# Patient Record
Sex: FEMALE | Race: WHITE | NOT HISPANIC OR LATINO | Employment: FULL TIME | URBAN - METROPOLITAN AREA
[De-identification: names, ages, dates, MRNs, and addresses within clinical notes are randomized per-mention and may not be internally consistent; named-entity substitution may affect disease eponyms.]

---

## 2022-05-01 ENCOUNTER — APPOINTMENT (OUTPATIENT)
Dept: CT IMAGING | Facility: CLINIC | Age: 47
End: 2022-05-01
Attending: EMERGENCY MEDICINE
Payer: COMMERCIAL

## 2022-05-01 ENCOUNTER — HOSPITAL ENCOUNTER (EMERGENCY)
Facility: CLINIC | Age: 47
Discharge: HOME | End: 2022-05-01
Attending: EMERGENCY MEDICINE | Admitting: EMERGENCY MEDICINE
Payer: COMMERCIAL

## 2022-05-01 VITALS
DIASTOLIC BLOOD PRESSURE: 85 MMHG | HEART RATE: 75 BPM | RESPIRATION RATE: 20 BRPM | SYSTOLIC BLOOD PRESSURE: 133 MMHG | TEMPERATURE: 97.9 F | OXYGEN SATURATION: 99 %

## 2022-05-01 DIAGNOSIS — R10.9 LEFT FLANK PAIN: ICD-10-CM

## 2022-05-01 LAB
ALBUMIN UR-MCNC: NEGATIVE MG/DL
ANION GAP SERPL CALCULATED.3IONS-SCNC: 5 MMOL/L (ref 3–14)
APPEARANCE UR: CLEAR
BACTERIA #/AREA URNS HPF: ABNORMAL /HPF
BASOPHILS # BLD AUTO: 0.1 10E3/UL (ref 0–0.2)
BASOPHILS NFR BLD AUTO: 1 %
BILIRUB UR QL STRIP: NEGATIVE
BUN SERPL-MCNC: 15 MG/DL (ref 7–30)
CALCIUM SERPL-MCNC: 10.1 MG/DL (ref 8.5–10.1)
CHLORIDE BLD-SCNC: 103 MMOL/L (ref 94–109)
CO2 SERPL-SCNC: 27 MMOL/L (ref 20–32)
COLOR UR AUTO: ABNORMAL
CREAT SERPL-MCNC: 0.79 MG/DL (ref 0.52–1.04)
EOSINOPHIL # BLD AUTO: 0.1 10E3/UL (ref 0–0.7)
EOSINOPHIL NFR BLD AUTO: 1 %
ERYTHROCYTE [DISTWIDTH] IN BLOOD BY AUTOMATED COUNT: 13.1 % (ref 10–15)
GFR SERPL CREATININE-BSD FRML MDRD: >90 ML/MIN/1.73M2
GLUCOSE BLD-MCNC: 163 MG/DL (ref 70–99)
GLUCOSE UR STRIP-MCNC: NEGATIVE MG/DL
HCT VFR BLD AUTO: 43.1 % (ref 35–47)
HGB BLD-MCNC: 14.4 G/DL (ref 11.7–15.7)
HGB UR QL STRIP: NEGATIVE
HOLD SPECIMEN: NORMAL
HYALINE CASTS: 3 /LPF
IMM GRANULOCYTES # BLD: 0 10E3/UL
IMM GRANULOCYTES NFR BLD: 0 %
KETONES UR STRIP-MCNC: NEGATIVE MG/DL
LEUKOCYTE ESTERASE UR QL STRIP: NEGATIVE
LYMPHOCYTES # BLD AUTO: 2.2 10E3/UL (ref 0.8–5.3)
LYMPHOCYTES NFR BLD AUTO: 23 %
MCH RBC QN AUTO: 28.9 PG (ref 26.5–33)
MCHC RBC AUTO-ENTMCNC: 33.4 G/DL (ref 31.5–36.5)
MCV RBC AUTO: 86 FL (ref 78–100)
MONOCYTES # BLD AUTO: 0.3 10E3/UL (ref 0–1.3)
MONOCYTES NFR BLD AUTO: 3 %
MUCOUS THREADS #/AREA URNS LPF: PRESENT /LPF
NEUTROPHILS # BLD AUTO: 6.8 10E3/UL (ref 1.6–8.3)
NEUTROPHILS NFR BLD AUTO: 72 %
NITRATE UR QL: NEGATIVE
NRBC # BLD AUTO: 0 10E3/UL
NRBC BLD AUTO-RTO: 0 /100
PH UR STRIP: 7.5 [PH] (ref 5–7)
PLATELET # BLD AUTO: 339 10E3/UL (ref 150–450)
POTASSIUM BLD-SCNC: 3.8 MMOL/L (ref 3.4–5.3)
RBC # BLD AUTO: 4.99 10E6/UL (ref 3.8–5.2)
RBC URINE: 1 /HPF
SODIUM SERPL-SCNC: 135 MMOL/L (ref 133–144)
SP GR UR STRIP: 1.02 (ref 1–1.03)
SQUAMOUS EPITHELIAL: 1 /HPF
UROBILINOGEN UR STRIP-MCNC: NORMAL MG/DL
WBC # BLD AUTO: 9.5 10E3/UL (ref 4–11)
WBC URINE: 1 /HPF

## 2022-05-01 PROCEDURE — 36415 COLL VENOUS BLD VENIPUNCTURE: CPT | Performed by: EMERGENCY MEDICINE

## 2022-05-01 PROCEDURE — 85025 COMPLETE CBC W/AUTO DIFF WBC: CPT | Performed by: EMERGENCY MEDICINE

## 2022-05-01 PROCEDURE — 99285 EMERGENCY DEPT VISIT HI MDM: CPT | Mod: 25

## 2022-05-01 PROCEDURE — 250N000011 HC RX IP 250 OP 636: Performed by: EMERGENCY MEDICINE

## 2022-05-01 PROCEDURE — 81003 URINALYSIS AUTO W/O SCOPE: CPT | Performed by: EMERGENCY MEDICINE

## 2022-05-01 PROCEDURE — 74177 CT ABD & PELVIS W/CONTRAST: CPT

## 2022-05-01 PROCEDURE — 258N000003 HC RX IP 258 OP 636: Performed by: EMERGENCY MEDICINE

## 2022-05-01 PROCEDURE — 96375 TX/PRO/DX INJ NEW DRUG ADDON: CPT

## 2022-05-01 PROCEDURE — 96374 THER/PROPH/DIAG INJ IV PUSH: CPT | Mod: 59

## 2022-05-01 PROCEDURE — 250N000009 HC RX 250: Performed by: EMERGENCY MEDICINE

## 2022-05-01 PROCEDURE — 80048 BASIC METABOLIC PNL TOTAL CA: CPT | Performed by: EMERGENCY MEDICINE

## 2022-05-01 PROCEDURE — 96361 HYDRATE IV INFUSION ADD-ON: CPT

## 2022-05-01 RX ORDER — OXYCODONE HYDROCHLORIDE 5 MG/1
5 TABLET ORAL EVERY 6 HOURS PRN
Qty: 10 TABLET | Refills: 0 | Status: SHIPPED | OUTPATIENT
Start: 2022-05-01

## 2022-05-01 RX ORDER — MORPHINE SULFATE 2 MG/ML
2-4 INJECTION, SOLUTION INTRAMUSCULAR; INTRAVENOUS EVERY 30 MIN PRN
Status: DISCONTINUED | OUTPATIENT
Start: 2022-05-01 | End: 2022-05-01 | Stop reason: HOSPADM

## 2022-05-01 RX ORDER — KETOROLAC TROMETHAMINE 15 MG/ML
15 INJECTION, SOLUTION INTRAMUSCULAR; INTRAVENOUS ONCE
Status: COMPLETED | OUTPATIENT
Start: 2022-05-01 | End: 2022-05-01

## 2022-05-01 RX ORDER — ONDANSETRON 4 MG/1
4 TABLET, ORALLY DISINTEGRATING ORAL EVERY 8 HOURS PRN
Qty: 10 TABLET | Refills: 0 | Status: SHIPPED | OUTPATIENT
Start: 2022-05-01 | End: 2022-05-04

## 2022-05-01 RX ORDER — IOPAMIDOL 755 MG/ML
500 INJECTION, SOLUTION INTRAVASCULAR ONCE
Status: COMPLETED | OUTPATIENT
Start: 2022-05-01 | End: 2022-05-01

## 2022-05-01 RX ORDER — MORPHINE SULFATE 4 MG/ML
4 INJECTION, SOLUTION INTRAMUSCULAR; INTRAVENOUS ONCE
Status: COMPLETED | OUTPATIENT
Start: 2022-05-01 | End: 2022-05-01

## 2022-05-01 RX ADMIN — SODIUM CHLORIDE 65 ML: 9 INJECTION, SOLUTION INTRAVENOUS at 09:44

## 2022-05-01 RX ADMIN — IOPAMIDOL 100 ML: 755 INJECTION, SOLUTION INTRAVENOUS at 09:44

## 2022-05-01 RX ADMIN — KETOROLAC TROMETHAMINE 15 MG: 15 INJECTION, SOLUTION INTRAMUSCULAR; INTRAVENOUS at 08:35

## 2022-05-01 RX ADMIN — SODIUM CHLORIDE, POTASSIUM CHLORIDE, SODIUM LACTATE AND CALCIUM CHLORIDE 1000 ML: 600; 310; 30; 20 INJECTION, SOLUTION INTRAVENOUS at 08:35

## 2022-05-01 RX ADMIN — MORPHINE SULFATE 4 MG: 4 INJECTION INTRAVENOUS at 10:05

## 2022-05-01 ASSESSMENT — ENCOUNTER SYMPTOMS
DIFFICULTY URINATING: 0
HEMATURIA: 0
CHILLS: 1
FREQUENCY: 0
DIARRHEA: 0
DYSURIA: 0
DIAPHORESIS: 1
ABDOMINAL PAIN: 1
CONSTIPATION: 0

## 2022-05-01 NOTE — ED PROVIDER NOTES
History   Chief Complaint:  Flank Pain       HPI   Valorie Gomes is a 46 year old female with history of type II diabetes, hypertension, hyperlipidemia who presents with left-sided flank pain since yesterday at 1100 accompanied by bloating, chills and a generally feeling of sickness. Her temperature was reported to be 98 yesterday. Endorses left flank pain, chills, diaphoresis and decreased bloating in the ED.She notes trouble sleeping last night. No changes in bowel habits. Yesterday she had a bowel movement and has not had one yet today. Denies urinary symptoms.    Review of Systems   Constitutional: Positive for chills and diaphoresis.   Gastrointestinal: Positive for abdominal pain. Negative for constipation and diarrhea.   Genitourinary: Negative for difficulty urinating, dysuria, frequency, hematuria and urgency.   All other systems reviewed and are negative.      Allergies:  Penicillins  Benadryl Allergy  Demerol    Medications:  Zyrtec  Prilosec  Hydrochlorothiazide  Cozaar  Lipitor  Glucophage  Ultram    Past Medical History:   Morbid obesity  Hyperlipidemia  Type II diabetes mellitus  Hypertension  Glottic stenosis  Major depressive disorder   Endometriosis  Hydrosalpinx  Ankle pain      Past Surgical History:    Hand/finger procedure  Pelvic laparoscopy  Left knee procedure  [place] teeth extraction  Pelviscopy  Endometrial biopsy  Hysterotomy, cystoscopy, bilateral salpingectomy, right oophorectomy  Laryngoscopy, bronchoscopy, CO2 laser, balloon dilations     Family History:    Father - alcoholism, atrial fibrillation, heart disease  Mother - breast cancer  Sister - PCOS, celiac disease    Social History:  The patient presents to the ED alone.    Physical Exam     Patient Vitals for the past 24 hrs:   BP Temp Pulse Resp SpO2   05/01/22 1140 133/85 -- 75 -- 99 %   05/01/22 1050 121/74 -- -- 20 92 %   05/01/22 1034 -- -- -- -- 99 %   05/01/22 1020 -- -- -- -- 98 %   05/01/22 0920 131/86 -- 86 -- --    05/01/22 0805 (!) 124/92 -- 94 -- --   05/01/22 0713 (!) 150/105 97.9  F (36.6  C) 108 18 99 %       Physical Exam    HENT:  mmm  Eyes: periorbital tissue and sclera normal  Neck: supple  CV: ppi, regular   Resp: speaking in full sentences without any resp distress  Abd: Tenderness to the left lower quadrant and left mid abdomen, no rigidity or significant distention  Ext: peripheral edema present:  No  Skin: warm dry well perfused  Neuro: Alert, no gross motor or sensory deficits,  gait stable        Emergency Department Course     Imaging:  Abd/pelvis CT,  IV  contrast only TRAUMA / AAA   Final Result   IMPRESSION:    1.  No acute abnormality demonstrated in the abdomen or pelvis.   2.  Mild fatty infiltration of the liver.           Report per radiology    Laboratory:  Labs Ordered and Resulted from Time of ED Arrival to Time of ED Departure   ROUTINE UA WITH MICROSCOPIC REFLEX TO CULTURE - Abnormal       Result Value    Color Urine Light Yellow      Appearance Urine Clear      Glucose Urine Negative      Bilirubin Urine Negative      Ketones Urine Negative      Specific Gravity Urine 1.018      Blood Urine Negative      pH Urine 7.5 (*)     Protein Albumin Urine Negative      Urobilinogen Urine Normal      Nitrite Urine Negative      Leukocyte Esterase Urine Negative      Bacteria Urine Few (*)     Mucus Urine Present (*)     RBC Urine 1      WBC Urine 1      Squamous Epithelials Urine 1      Hyaline Casts Urine 3 (*)    BASIC METABOLIC PANEL - Abnormal    Sodium 135      Potassium 3.8      Chloride 103      Carbon Dioxide (CO2) 27      Anion Gap 5      Urea Nitrogen 15      Creatinine 0.79      Calcium 10.1      Glucose 163 (*)     GFR Estimate >90     CBC WITH PLATELETS AND DIFFERENTIAL    WBC Count 9.5      RBC Count 4.99      Hemoglobin 14.4      Hematocrit 43.1      MCV 86      MCH 28.9      MCHC 33.4      RDW 13.1      Platelet Count 339      % Neutrophils 72      % Lymphocytes 23      % Monocytes 3       % Eosinophils 1      % Basophils 1      % Immature Granulocytes 0      NRBCs per 100 WBC 0      Absolute Neutrophils 6.8      Absolute Lymphocytes 2.2      Absolute Monocytes 0.3      Absolute Eosinophils 0.1      Absolute Basophils 0.1      Absolute Immature Granulocytes 0.0      Absolute NRBCs 0.0          Procedures  None    Emergency Department Course:       Reviewed:  I reviewed nursing notes, vitals, past medical history and Care Everywhere    Assessments:  0829 I obtained history and examined the patient as noted above.     1202 I rechecked the patient and explained findings.     Interventions:  0835 Toradol 15 mg IV  0835 Lactated ringers bolus 1,000 mL IV    Disposition:  The patient was discharged to home.     Impression & Plan     CMS Diagnoses: None      Medical Decision Makin-year-old female here with left-sided abdominal/flank pain.  Etiology unclear despite work-up here.  Work-up is reassuring that there is no emergent surgical vascular infectious process present.  At this point think she is safe and stable for discharge home to allow this process to clinically declare itself.  She will return with any new or worsening symptoms.    Critical Care Time: None    Diagnosis:    ICD-10-CM    1. Left flank pain  R10.9        Discharge Medications:  Discharge Medication List as of 2022 12:05 PM      START taking these medications    Details   ondansetron (ZOFRAN ODT) 4 MG ODT tab Take 1 tablet (4 mg) by mouth every 8 hours as needed for nausea or vomiting, Disp-10 tablet, R-0, E-Prescribe      oxyCODONE (ROXICODONE) 5 MG tablet Take 1 tablet (5 mg) by mouth every 6 hours as needed for severe pain, Disp-10 tablet, R-0, E-Prescribe             Scribe Disclosure:  I, Lizabeth Tong, am serving as a scribe at 8:16 AM on 2022 to document services personally performed by Helio Last MD based on my observations and the provider's statements to me.              Helio Last,  MD  05/01/22 1604

## 2022-05-01 NOTE — ED TRIAGE NOTES
Patient presents to the ED reporting left flank pain, bloating and nausea since 1100 yesterday. States pain has been unrelieved with ibuprofen. Reports tried to take a left over tramadol to relieve pain, but vomited it back up. Denies history of kidney stones.

## 2022-05-04 ENCOUNTER — HOSPITAL ENCOUNTER (EMERGENCY)
Facility: CLINIC | Age: 47
Discharge: HOME | End: 2022-05-05
Attending: EMERGENCY MEDICINE | Admitting: EMERGENCY MEDICINE
Payer: COMMERCIAL

## 2022-05-04 DIAGNOSIS — M62.830 LUMBAR PARASPINAL MUSCLE SPASM: ICD-10-CM

## 2022-05-04 PROBLEM — E78.2 HYPERLIPIDEMIA, MIXED: Status: ACTIVE | Noted: 2021-06-23

## 2022-05-04 PROBLEM — I10 HTN (HYPERTENSION): Status: ACTIVE | Noted: 2017-08-22

## 2022-05-04 PROBLEM — J38.6 SUBGLOTTIC STENOSIS: Status: ACTIVE | Noted: 2020-04-14

## 2022-05-04 PROBLEM — E11.65 UNCONTROLLED TYPE 2 DIABETES MELLITUS WITH HYPERGLYCEMIA (H): Status: ACTIVE | Noted: 2021-06-22

## 2022-05-04 LAB
ALBUMIN SERPL-MCNC: 3.9 G/DL (ref 3.4–5)
ALP SERPL-CCNC: 94 U/L (ref 40–150)
ALT SERPL W P-5'-P-CCNC: 29 U/L (ref 0–50)
ANION GAP SERPL CALCULATED.3IONS-SCNC: 4 MMOL/L (ref 3–14)
AST SERPL W P-5'-P-CCNC: 13 U/L (ref 0–45)
BASOPHILS # BLD AUTO: 0.1 10E3/UL (ref 0–0.2)
BASOPHILS NFR BLD AUTO: 1 %
BILIRUB DIRECT SERPL-MCNC: 0.2 MG/DL (ref 0–0.2)
BILIRUB SERPL-MCNC: 0.6 MG/DL (ref 0.2–1.3)
BUN SERPL-MCNC: 12 MG/DL (ref 7–30)
CALCIUM SERPL-MCNC: 9.5 MG/DL (ref 8.5–10.1)
CHLORIDE BLD-SCNC: 101 MMOL/L (ref 94–109)
CO2 SERPL-SCNC: 29 MMOL/L (ref 20–32)
CREAT SERPL-MCNC: 0.76 MG/DL (ref 0.52–1.04)
EOSINOPHIL # BLD AUTO: 0.1 10E3/UL (ref 0–0.7)
EOSINOPHIL NFR BLD AUTO: 1 %
ERYTHROCYTE [DISTWIDTH] IN BLOOD BY AUTOMATED COUNT: 13 % (ref 10–15)
GFR SERPL CREATININE-BSD FRML MDRD: >90 ML/MIN/1.73M2
GLUCOSE BLD-MCNC: 182 MG/DL (ref 70–99)
HCT VFR BLD AUTO: 40.5 % (ref 35–47)
HGB BLD-MCNC: 13.6 G/DL (ref 11.7–15.7)
IMM GRANULOCYTES # BLD: 0 10E3/UL
IMM GRANULOCYTES NFR BLD: 0 %
LIPASE SERPL-CCNC: 88 U/L (ref 73–393)
LYMPHOCYTES # BLD AUTO: 2.3 10E3/UL (ref 0.8–5.3)
LYMPHOCYTES NFR BLD AUTO: 23 %
MCH RBC QN AUTO: 28.8 PG (ref 26.5–33)
MCHC RBC AUTO-ENTMCNC: 33.6 G/DL (ref 31.5–36.5)
MCV RBC AUTO: 86 FL (ref 78–100)
MONOCYTES # BLD AUTO: 0.5 10E3/UL (ref 0–1.3)
MONOCYTES NFR BLD AUTO: 5 %
NEUTROPHILS # BLD AUTO: 6.9 10E3/UL (ref 1.6–8.3)
NEUTROPHILS NFR BLD AUTO: 70 %
NRBC # BLD AUTO: 0 10E3/UL
NRBC BLD AUTO-RTO: 0 /100
PLATELET # BLD AUTO: 311 10E3/UL (ref 150–450)
POTASSIUM BLD-SCNC: 3.3 MMOL/L (ref 3.4–5.3)
PROT SERPL-MCNC: 7.5 G/DL (ref 6.8–8.8)
RBC # BLD AUTO: 4.72 10E6/UL (ref 3.8–5.2)
SODIUM SERPL-SCNC: 134 MMOL/L (ref 133–144)
WBC # BLD AUTO: 9.9 10E3/UL (ref 4–11)

## 2022-05-04 PROCEDURE — 96374 THER/PROPH/DIAG INJ IV PUSH: CPT

## 2022-05-04 PROCEDURE — 80053 COMPREHEN METABOLIC PANEL: CPT | Performed by: EMERGENCY MEDICINE

## 2022-05-04 PROCEDURE — 85025 COMPLETE CBC W/AUTO DIFF WBC: CPT | Performed by: EMERGENCY MEDICINE

## 2022-05-04 PROCEDURE — 96375 TX/PRO/DX INJ NEW DRUG ADDON: CPT

## 2022-05-04 PROCEDURE — 83690 ASSAY OF LIPASE: CPT | Performed by: EMERGENCY MEDICINE

## 2022-05-04 PROCEDURE — 82248 BILIRUBIN DIRECT: CPT | Performed by: EMERGENCY MEDICINE

## 2022-05-04 PROCEDURE — 99284 EMERGENCY DEPT VISIT MOD MDM: CPT | Mod: 25

## 2022-05-04 PROCEDURE — 250N000013 HC RX MED GY IP 250 OP 250 PS 637: Performed by: EMERGENCY MEDICINE

## 2022-05-04 PROCEDURE — 36415 COLL VENOUS BLD VENIPUNCTURE: CPT | Performed by: EMERGENCY MEDICINE

## 2022-05-04 RX ORDER — MORPHINE SULFATE 4 MG/ML
4 INJECTION, SOLUTION INTRAMUSCULAR; INTRAVENOUS
Status: DISCONTINUED | OUTPATIENT
Start: 2022-05-04 | End: 2022-05-04

## 2022-05-04 RX ORDER — KETOROLAC TROMETHAMINE 15 MG/ML
10 INJECTION, SOLUTION INTRAMUSCULAR; INTRAVENOUS ONCE
Status: DISCONTINUED | OUTPATIENT
Start: 2022-05-04 | End: 2022-05-04

## 2022-05-04 RX ORDER — LIDOCAINE 4 G/G
2 PATCH TOPICAL ONCE
Status: DISCONTINUED | OUTPATIENT
Start: 2022-05-04 | End: 2022-05-05 | Stop reason: HOSPADM

## 2022-05-04 RX ORDER — CYCLOBENZAPRINE HCL 10 MG
10 TABLET ORAL ONCE
Status: COMPLETED | OUTPATIENT
Start: 2022-05-04 | End: 2022-05-04

## 2022-05-04 RX ORDER — HYDROCODONE BITARTRATE AND ACETAMINOPHEN 5; 325 MG/1; MG/1
2 TABLET ORAL ONCE
Status: COMPLETED | OUTPATIENT
Start: 2022-05-04 | End: 2022-05-04

## 2022-05-04 RX ORDER — ONDANSETRON 2 MG/ML
4 INJECTION INTRAMUSCULAR; INTRAVENOUS ONCE
Status: COMPLETED | OUTPATIENT
Start: 2022-05-04 | End: 2022-05-05

## 2022-05-04 RX ADMIN — CYCLOBENZAPRINE HYDROCHLORIDE 10 MG: 10 TABLET, FILM COATED ORAL at 23:38

## 2022-05-04 RX ADMIN — LIDOCAINE 2 PATCH: 246 PATCH TOPICAL at 23:38

## 2022-05-04 RX ADMIN — HYDROCODONE BITARTRATE AND ACETAMINOPHEN 2 TABLET: 5; 325 TABLET ORAL at 23:38

## 2022-05-04 ASSESSMENT — ENCOUNTER SYMPTOMS
DYSURIA: 0
FREQUENCY: 0
VOMITING: 1
DIFFICULTY URINATING: 0
SHORTNESS OF BREATH: 0
FLANK PAIN: 1
FEVER: 0
DIARRHEA: 0
NAUSEA: 1

## 2022-05-05 VITALS
BODY MASS INDEX: 40.03 KG/M2 | DIASTOLIC BLOOD PRESSURE: 77 MMHG | RESPIRATION RATE: 18 BRPM | WEIGHT: 248 LBS | HEART RATE: 67 BPM | TEMPERATURE: 97.8 F | OXYGEN SATURATION: 97 % | SYSTOLIC BLOOD PRESSURE: 124 MMHG

## 2022-05-05 LAB
ALBUMIN UR-MCNC: NEGATIVE MG/DL
APPEARANCE UR: CLEAR
BILIRUB UR QL STRIP: NEGATIVE
COLOR UR AUTO: ABNORMAL
GLUCOSE UR STRIP-MCNC: NEGATIVE MG/DL
HGB UR QL STRIP: NEGATIVE
KETONES UR STRIP-MCNC: NEGATIVE MG/DL
LEUKOCYTE ESTERASE UR QL STRIP: NEGATIVE
MUCOUS THREADS #/AREA URNS LPF: PRESENT /LPF
NITRATE UR QL: NEGATIVE
PH UR STRIP: 5 [PH] (ref 5–7)
RBC URINE: <1 /HPF
SP GR UR STRIP: 1.01 (ref 1–1.03)
SQUAMOUS EPITHELIAL: 1 /HPF
UROBILINOGEN UR STRIP-MCNC: NORMAL MG/DL
WBC URINE: 3 /HPF

## 2022-05-05 PROCEDURE — 250N000011 HC RX IP 250 OP 636: Performed by: EMERGENCY MEDICINE

## 2022-05-05 PROCEDURE — 81001 URINALYSIS AUTO W/SCOPE: CPT | Performed by: EMERGENCY MEDICINE

## 2022-05-05 PROCEDURE — 250N000013 HC RX MED GY IP 250 OP 250 PS 637: Performed by: EMERGENCY MEDICINE

## 2022-05-05 RX ORDER — CYCLOBENZAPRINE HCL 10 MG
5-10 TABLET ORAL 3 TIMES DAILY PRN
Qty: 15 TABLET | Refills: 0 | Status: SHIPPED | OUTPATIENT
Start: 2022-05-05

## 2022-05-05 RX ORDER — POTASSIUM CHLORIDE 1500 MG/1
20 TABLET, EXTENDED RELEASE ORAL ONCE
Status: COMPLETED | OUTPATIENT
Start: 2022-05-05 | End: 2022-05-05

## 2022-05-05 RX ORDER — HYDROCODONE BITARTRATE AND ACETAMINOPHEN 5; 325 MG/1; MG/1
1 TABLET ORAL EVERY 6 HOURS PRN
Qty: 10 TABLET | Refills: 0 | Status: SHIPPED | OUTPATIENT
Start: 2022-05-05 | End: 2022-05-08

## 2022-05-05 RX ORDER — KETOROLAC TROMETHAMINE 15 MG/ML
15 INJECTION, SOLUTION INTRAMUSCULAR; INTRAVENOUS ONCE
Status: COMPLETED | OUTPATIENT
Start: 2022-05-05 | End: 2022-05-05

## 2022-05-05 RX ADMIN — POTASSIUM CHLORIDE 20 MEQ: 1500 TABLET, EXTENDED RELEASE ORAL at 00:56

## 2022-05-05 RX ADMIN — KETOROLAC TROMETHAMINE 15 MG: 15 INJECTION, SOLUTION INTRAMUSCULAR; INTRAVENOUS at 00:56

## 2022-05-05 RX ADMIN — ONDANSETRON 4 MG: 2 INJECTION INTRAMUSCULAR; INTRAVENOUS at 00:00

## 2022-05-05 NOTE — ED PROVIDER NOTES
History   Chief Complaint:  Back Pain     The history is provided by the patient.      Valorie Gomes is a 46 year old female with history of endometriosis, hypertension, hyperlipidemia, and type II diabetes who presents with left sided back pain. She says that her symptoms started 4 days ago and worsened throughout the day where it was throbbing at the end of the day. She was seen here the next day where she had a CT and laboratory work done with results below. She was given 2 days of Oxycodone to help with the pain and she comes back to the ED tonight after she ran out of the medication last night. She has been taking 800-1000 mg of Ibuprofen every 5 hours as well, but says that it has not helped after she ran out of the Oxycodone. She describes her pain as constant and throbbing and says that it wraps around to her left side/lateral abdomen. It is worsened with movement and position changes and was somewhat alleviated while taking a hot shower. She has had nausea and vomiting with this but denies having any diarrhea. She further denies any recent fevers, chest pain, shortness of breath, dysuria, urinary frequency, difficulty urinating, or vaginal pain. No saddle anesthesia. No bowel or bladder dysfunction. She denies any trauma or falls. She denies any numbness, tingling, or weakness.     Abd/pelvis CT,  IV  contrast only TRAUMA / AAA from 5/1/2022:  1.  No acute abnormality demonstrated in the abdomen or pelvis.  2.  Mild fatty infiltration of the liver.    Laboratory Workup from 5/1/2022:  CBC: WBC 9.5, HGB 14.4,    BMP: Glucose 163 (H), o/w WNL (Creatinine 0.79)   UA with microscopic: pH 7.5 (H), Bacteria Few (A), Mucus Present (A), Hyaline Casts 3 (H), o/w WNL     Review of Systems   Constitutional: Negative for fever.   Respiratory: Negative for shortness of breath.    Cardiovascular: Negative for chest pain.   Gastrointestinal: Positive for nausea and vomiting. Negative for diarrhea.    Genitourinary: Positive for flank pain (left side). Negative for difficulty urinating, dysuria, frequency and vaginal pain.   All other systems reviewed and are negative.      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     Social History:  Patient came from home.  Patient is unaccompanied in the ED.    Physical Exam     Patient Vitals for the past 24 hrs:   BP Temp Temp src Pulse Resp SpO2 Weight   05/05/22 0215 -- -- -- -- -- 97 % --   05/05/22 0200 124/77 -- -- 67 -- 97 % --   05/05/22 0145 126/74 -- -- -- -- 96 % --   05/05/22 0130 -- -- -- -- -- 98 % --   05/05/22 0115 127/76 -- -- -- -- 96 % --   05/05/22 0100 (!) 157/87 -- -- 73 -- 100 % --   05/04/22 2117 (!) 148/92 97.8  F (36.6  C) Temporal 103 18 99 % 112.5 kg (248 lb)       Physical Exam  General: Well appearing, nontoxic. Resting comfortably  Head:  Scalp, face, and head appear normal  Eyes:  Pupils are equal, round    Conjunctivae non-injected and sclerae white  ENT:    The external nose is normal    Pinnae are normal  Neck:  Normal range of motion    There is no rigidity noted    Trachea is in the midline  CV:  Regular rate and rhythm     Normal S1/S2, no S3/S4    No murmur or rub. Radial pulses 2+ bilaterally.  Resp:  Lungs are clear and equal bilaterally  There is no tachypnea    No increased work of breathing    No rales, wheezing, or rhonchi  GI:  Abdomen is soft, obese, no rigidity or guarding    No distension    No  tenderness  MS:  Normal muscular tone. Significant left lumbar paraspinal muscle spasm and tenderness to palpation without overlying skin changes. T and L spine non-tender without stepoffs.    Symmetric motor strength    No lower extremity edema. No calf swelling or tenderness. CMS intact bilateral lower extremities.   Skin:  No rash or acute skin lesions noted  Neuro: Awake and alert  Speech is normal and fluent  Moves all extremities spontaneously  Psych:  Normal affect. Appropriate interactions.      Emergency Department Course     Laboratory:    Labs Ordered and Resulted from Time of ED Arrival to Time of ED Departure   BASIC METABOLIC PANEL - Abnormal       Result Value    Sodium 134      Potassium 3.3 (*)     Chloride 101      Carbon Dioxide (CO2) 29      Anion Gap 4      Urea Nitrogen 12      Creatinine 0.76      Calcium 9.5      Glucose 182 (*)     GFR Estimate >90     ROUTINE UA WITH MICROSCOPIC - Abnormal    Color Urine Light Yellow      Appearance Urine Clear      Glucose Urine Negative      Bilirubin Urine Negative      Ketones Urine Negative      Specific Gravity Urine 1.014      Blood Urine Negative      pH Urine 5.0      Protein Albumin Urine Negative      Urobilinogen Urine Normal      Nitrite Urine Negative      Leukocyte Esterase Urine Negative      Mucus Urine Present (*)     RBC Urine <1      WBC Urine 3      Squamous Epithelials Urine 1     HEPATIC FUNCTION PANEL - Normal    Bilirubin Total 0.6      Bilirubin Direct 0.2      Protein Total 7.5      Albumin 3.9      Alkaline Phosphatase 94      AST 13      ALT 29     LIPASE - Normal    Lipase 88     CBC WITH PLATELETS AND DIFFERENTIAL    WBC Count 9.9      RBC Count 4.72      Hemoglobin 13.6      Hematocrit 40.5      MCV 86      MCH 28.8      MCHC 33.6      RDW 13.0      Platelet Count 311      % Neutrophils 70      % Lymphocytes 23      % Monocytes 5      % Eosinophils 1      % Basophils 1      % Immature Granulocytes 0      NRBCs per 100 WBC 0       Absolute Neutrophils 6.9      Absolute Lymphocytes 2.3      Absolute Monocytes 0.5      Absolute Eosinophils 0.1      Absolute Basophils 0.1      Absolute Immature Granulocytes 0.0      Absolute NRBCs 0.0          Emergency Department Course:       Reviewed:  I reviewed nursing notes, vitals, past medical history and Care Everywhere    Assessments/Consults:  ED Course as of 05/05/22 0247   Wed May 04, 2022   2305 I obtained history and examined the patient as noted above.   u May 05, 2022   0152 I rechecked the patient and explained findings.   0204 I rechecked the patient.       Interventions:  2338 Norco 2 tablet PO  2338 Flexeril 10 mg PO  2338 Lidocare 1 patch Transdermal  0000 Zofran 4 mg IV  0056 Toradol 15 mg IV  0056 Klor-con m 20 mEq PO    Disposition:  The patient was discharged to home.     Impression & Plan     Medical Decision Making:  Valorie Gomes is a 46 year old female who presents with persistent left back pain. On my evaluation the patient is well appearing, hemodynamically stable and afebrile. She was seen in the ED recently had had neg CT Abd/Pelvis and normal workup. Repeat lab studies here today unremarkable. No evidence of kidney stone, UTI, or acute intraabdominal process. Exam consistent with myofascial spasm/strain of the left paraspinal musculature/soft tissues. No evidence of shingles.   Pain has improved with interventions in the emergency department. No acute bony pathology seen on recent CT.  The patient has not had a fever, saddle anesthesia, or bowel or bladder dysfunction.  There is no clinical evidence of cauda equina syndrome, discitis, spinal hematoma or epidural abscess. The neurovascular exam is normal and the patient's symptoms are consistent with a musculoskeletal etiology.The patient will be discharged with pain medications to use as directed.  Ice or heat to the back and stretching exercises.  No heavy lifting, bending or twisting. Return if increasing pain,  numbness, weakness, or bowel or bladder dysfunction.  They should schedule follow-up with their doctor within 3 days if not improving. Return precautions were discussed with patient. The patient's questions were answered and the patient was agreeable with discharge.     Diagnosis:    ICD-10-CM    1. Lumbar paraspinal muscle spasm  M62.830        Discharge Medications:  Discharge Medication List as of 5/5/2022  2:01 AM      START taking these medications    Details   cyclobenzaprine (FLEXERIL) 10 MG tablet Take 0.5-1 tablets (5-10 mg) by mouth 3 times daily as needed for muscle spasms, Disp-15 tablet, R-0, Local Print      HYDROcodone-acetaminophen (NORCO) 5-325 MG tablet Take 1 tablet by mouth every 6 hours as needed for severe pain, Disp-10 tablet, R-0, Local Print             Scribe Disclosure:  Paulo JOHNSON, am serving as a scribe at 11:04 PM on 5/4/2022 to document services personally performed by Umair Alatorre MD based on my observations and the provider's statements to me.        Umair Alatorre MD  05/05/22 1828

## 2022-05-06 ENCOUNTER — PATIENT OUTREACH (OUTPATIENT)
Dept: CARE COORDINATION | Facility: CLINIC | Age: 47
End: 2022-05-06
Payer: COMMERCIAL

## 2022-05-06 DIAGNOSIS — Z71.89 OTHER SPECIFIED COUNSELING: ICD-10-CM

## 2024-02-23 NOTE — PROGRESS NOTES
"Clinic Care Coordination Contact  [facility]: Post-Discharge Note  SITUATION                                                      Admission:    Admission Date: 05/04/22   Reason for Admission: Abdominal Pain  Discharge:   Discharge Date: 05/04/22  Discharge Diagnosis: Abdominal Pain    BACKGROUND                                                      Per hospital discharge summary and inpatient provider notes:    Valorie Gomes is a 46 year old female with history of endometriosis, hypertension, hyperlipidemia, and type II diabetes who presents with left sided back pain. She says that her symptoms started 4 days ago and worsened throughout the day where it was throbbing at the end of the day. She was seen here the next day where she had a CT and laboratory work done with results below. She was given 2 days of Oxycodone to help with the pain and she comes back to the ED tonight after she ran out of the medication last night. She has been taking 800-1000 mg of Ibuprofen every 5 hours as well, but says that it has not helped after she ran out of the Oxycodone. She describes her pain as constant and throbbing and says that it wraps around to her left side/lateral abdomen. It is worsened with movement and position changes and was somewhat alleviated while taking a hot shower. She has had nausea and vomiting with this but denies having any diarrhea. She further denies any recent fevers, chest pain, shortness of breath, dysuria, urinary frequency, difficulty urinating, or vaginal pain. No saddle anesthesia. No bowel or bladder dysfunction. She denies any trauma or falls. She denies any numbness, tingling, or weakness.     ASSESSMENT      Enrollment  Primary Care Care Coordination Status: Not a Candidate    Discharge Assessment  How are you doing now that you are home?: \"I'm hopefully on the up and up but I'm good\"  How are your symptoms? (Red Flag symptoms escalate to triage hotline per guidelines): Improved  Do you " Patient has a new insurance and will check if they will cover her procedure if she has to do it earlier.  Patient will call back once she heard something from her insurance.    Patient history questionnaire done       feel your condition is stable enough to be safe at home until your provider visit?: Yes  Does the patient have their discharge instructions? : Yes  Does the patient have questions regarding their discharge instructions? : No  Were you started on any new medications or were there changes to any of your previous medications? : Yes  Does the patient have all of their medications?: Yes  Do you have questions regarding any of your medications? : No  Do you have all of your needed medical supplies or equipment (DME)?  (i.e. oxygen tank, CPAP, cane, etc.): Yes  Discharge follow-up appointment scheduled within 14 calendar days? : Yes                  PLAN                                                      Outpatient Plan:  Schedule an appointment with Primary Care  Physician as soon as possible for a visit in 3 days  (around 5/8/2022)    No future appointments.      For any urgent concerns, please contact our 24 hour nurse triage line: [phone] ([phone])         Radha Ribeiro  Community Health Worker  [facility]  [phone]
